# Patient Record
Sex: MALE | Race: WHITE | NOT HISPANIC OR LATINO | URBAN - METROPOLITAN AREA
[De-identification: names, ages, dates, MRNs, and addresses within clinical notes are randomized per-mention and may not be internally consistent; named-entity substitution may affect disease eponyms.]

---

## 2019-02-26 ENCOUNTER — IMPORTED ENCOUNTER (OUTPATIENT)
Dept: URBAN - METROPOLITAN AREA CLINIC 50 | Facility: CLINIC | Age: 79
End: 2019-02-26

## 2019-10-01 ENCOUNTER — IMPORTED ENCOUNTER (OUTPATIENT)
Dept: URBAN - METROPOLITAN AREA CLINIC 50 | Facility: CLINIC | Age: 79
End: 2019-10-01

## 2020-06-17 NOTE — PATIENT DISCUSSION
Corneal Scar Counseling: I have discussed the diagnosis of corneal scar with the patient. I have recommended the use of artificial tears to minimize dryness.  Consider future consult with Corneal Specialist.

## 2020-06-17 NOTE — PATIENT DISCUSSION
CORNEAL ULCER, OS:  CONTACT [facility]. PRESCRIBE OCUFLOX Q15 MIN FOR 1 HR THEN Q1H THEREAFTER. RX EMYCIN SOINA QHS. RETURN FOR FOLLOW-UP AS SCHEDULED.

## 2020-10-06 ENCOUNTER — IMPORTED ENCOUNTER (OUTPATIENT)
Dept: URBAN - METROPOLITAN AREA CLINIC 50 | Facility: CLINIC | Age: 80
End: 2020-10-06

## 2021-04-17 ASSESSMENT — VISUAL ACUITY
OS_CC: J1+
OS_CC: 20/30-1
OD_CC: 20/30-2
OS_PH: 20/25-2
OD_CC: 20/40+1
OS_CC: 20/30
OS_SC: 20/40-1
OD_OTHER: 20/20-. 20/30-.
OS_BAT: 20/30
OS_CC: J1@ 14 IN
OS_CC: J1@ 14 IN
OS_OTHER: 20/70. 20/400.
OD_CC: J1+
OD_BAT: 20/70
OD_BAT: 20/50
OD_SC: 20/25
OD_BAT: 20/20-
OD_CC: J1@ 14 IN
OS_CC: 20/30-1
OS_BAT: 20/70
OD_CC: J1@ 14 IN
OD_OTHER: 20/50. 20/200.
OS_OTHER: 20/30. 20/50.
OS_BAT: 20/60-1

## 2021-04-17 ASSESSMENT — TONOMETRY
OD_IOP_MMHG: 12
OD_IOP_MMHG: 12
OS_IOP_MMHG: 13
OS_IOP_MMHG: 12
OS_IOP_MMHG: 11
OD_IOP_MMHG: 12

## 2021-10-22 ENCOUNTER — PREPPED CHART (OUTPATIENT)
Dept: URBAN - METROPOLITAN AREA CLINIC 52 | Facility: CLINIC | Age: 81
End: 2021-10-22

## 2021-10-28 ENCOUNTER — CATARACT EVALUATION (OUTPATIENT)
Dept: URBAN - METROPOLITAN AREA CLINIC 52 | Facility: CLINIC | Age: 81
End: 2021-10-28

## 2021-10-28 DIAGNOSIS — H25.811: ICD-10-CM

## 2021-10-28 DIAGNOSIS — H25.812: ICD-10-CM

## 2021-10-28 DIAGNOSIS — H43.813: ICD-10-CM

## 2021-10-28 DIAGNOSIS — H33.011: ICD-10-CM

## 2021-10-28 PROCEDURE — 92134 CPTRZ OPH DX IMG PST SGM RTA: CPT

## 2021-10-28 PROCEDURE — 92015 DETERMINE REFRACTIVE STATE: CPT

## 2021-10-28 PROCEDURE — 92014 COMPRE OPH EXAM EST PT 1/>: CPT

## 2021-10-28 ASSESSMENT — VISUAL ACUITY
OU_CC: 20/30
OD_SC: 20/70+1
OD_CC: 20/70
OS_CC: 20/30
OU_CC: J1+ @ 14IN
OS_GLARE: 20/70
OS_SC: 20/40-1
OD_PH: 20/40
OS_CC: J1+ @ 14IN
OS_PH: 20/30
OD_GLARE: 20/400
OS_GLARE: 20/40
OD_CC: J1+ @ 14IN
OD_GLARE: 20/70

## 2021-10-28 ASSESSMENT — TONOMETRY
OD_IOP_MMHG: 10
OS_IOP_MMHG: 10

## 2021-11-10 ENCOUNTER — BIOMETRY (OUTPATIENT)
Dept: URBAN - METROPOLITAN AREA CLINIC 52 | Facility: CLINIC | Age: 81
End: 2021-11-10

## 2021-11-10 DIAGNOSIS — H25.811: ICD-10-CM

## 2021-11-10 PROCEDURE — TOPOIOL CORNEAL TOPOGRAPHY-PREMIUM IOL

## 2021-11-10 PROCEDURE — 92136 OPHTHALMIC BIOMETRY: CPT

## 2021-11-10 ASSESSMENT — KERATOMETRY
OS_K2POWER_DIOPTERS: 43.62
OD_K2POWER_DIOPTERS: 43.00
OD_AXISANGLE2_DEGREES: 90
OS_K1POWER_DIOPTERS: 43.62
OS_AXISANGLE2_DEGREES: 90
OS_AXISANGLE_DEGREES: 0
OD_K1POWER_DIOPTERS: 43.00
OD_AXISANGLE_DEGREES: 0

## 2021-12-09 ENCOUNTER — PRE-OP/H&P (OUTPATIENT)
Dept: URBAN - METROPOLITAN AREA CLINIC 52 | Facility: CLINIC | Age: 81
End: 2021-12-09

## 2021-12-09 DIAGNOSIS — H25.811: ICD-10-CM

## 2021-12-09 DIAGNOSIS — H43.813: ICD-10-CM

## 2021-12-09 PROCEDURE — 92134 CPTRZ OPH DX IMG PST SGM RTA: CPT

## 2021-12-09 PROCEDURE — PREOP PRE OP VISIT

## 2021-12-09 ASSESSMENT — KERATOMETRY
OD_K2POWER_DIOPTERS: 43.00
OS_AXISANGLE2_DEGREES: 90
OS_K2POWER_DIOPTERS: 43.62
OD_AXISANGLE2_DEGREES: 90
OS_K1POWER_DIOPTERS: 43.62
OD_AXISANGLE_DEGREES: 0
OS_AXISANGLE_DEGREES: 0
OD_K1POWER_DIOPTERS: 43.00

## 2021-12-09 ASSESSMENT — VISUAL ACUITY
OS_SC: 20/40
OD_SC: 20/60

## 2021-12-09 ASSESSMENT — TONOMETRY
OS_IOP_MMHG: 11
OD_IOP_MMHG: 11

## 2021-12-15 ENCOUNTER — SURGERY/PROCEDURE (OUTPATIENT)
Dept: URBAN - METROPOLITAN AREA SURGERY 16 | Facility: SURGERY | Age: 81
End: 2021-12-15

## 2021-12-15 DIAGNOSIS — H25.811: ICD-10-CM

## 2021-12-15 PROBLEM — Z96.1: Noted: 2021-12-15

## 2021-12-15 PROCEDURE — 66984 XCAPSL CTRC RMVL W/O ECP: CPT

## 2021-12-15 ASSESSMENT — KERATOMETRY
OD_AXISANGLE_DEGREES: 0
OD_AXISANGLE2_DEGREES: 90
OD_K2POWER_DIOPTERS: 43.00
OS_AXISANGLE2_DEGREES: 90
OS_K2POWER_DIOPTERS: 43.62
OS_K1POWER_DIOPTERS: 43.62
OS_AXISANGLE_DEGREES: 0
OD_K1POWER_DIOPTERS: 43.00

## 2021-12-16 ENCOUNTER — POST-OP (OUTPATIENT)
Dept: URBAN - METROPOLITAN AREA CLINIC 52 | Facility: CLINIC | Age: 81
End: 2021-12-16

## 2021-12-16 DIAGNOSIS — Z98.41: ICD-10-CM

## 2021-12-16 DIAGNOSIS — Z96.1: ICD-10-CM

## 2021-12-16 PROCEDURE — 99024 POSTOP FOLLOW-UP VISIT: CPT

## 2021-12-16 ASSESSMENT — KERATOMETRY
OS_K2POWER_DIOPTERS: 43.62
OD_AXISANGLE2_DEGREES: 90
OD_AXISANGLE_DEGREES: 0
OD_K1POWER_DIOPTERS: 43.00
OS_AXISANGLE_DEGREES: 0
OD_K2POWER_DIOPTERS: 43.00
OS_K1POWER_DIOPTERS: 43.62
OS_AXISANGLE2_DEGREES: 90

## 2021-12-16 ASSESSMENT — VISUAL ACUITY: OD_SC: 20/50

## 2021-12-16 ASSESSMENT — TONOMETRY: OD_IOP_MMHG: 15

## 2021-12-22 ENCOUNTER — POST-OP (OUTPATIENT)
Dept: URBAN - METROPOLITAN AREA CLINIC 52 | Facility: CLINIC | Age: 81
End: 2021-12-22

## 2021-12-22 DIAGNOSIS — Z98.41: ICD-10-CM

## 2021-12-22 DIAGNOSIS — Z96.1: ICD-10-CM

## 2021-12-22 PROCEDURE — 99024 POSTOP FOLLOW-UP VISIT: CPT

## 2021-12-22 ASSESSMENT — TONOMETRY
OD_IOP_MMHG: 12
OS_IOP_MMHG: 12

## 2021-12-22 ASSESSMENT — VISUAL ACUITY
OD_PH: 20/30
OD_SC: 20/40
OS_CC: 20/30

## 2021-12-22 ASSESSMENT — KERATOMETRY
OD_K2POWER_DIOPTERS: 43.00
OS_K1POWER_DIOPTERS: 43.62
OD_AXISANGLE2_DEGREES: 90
OS_K2POWER_DIOPTERS: 43.62
OD_K1POWER_DIOPTERS: 43.00
OS_AXISANGLE2_DEGREES: 90
OS_AXISANGLE_DEGREES: 0
OD_AXISANGLE_DEGREES: 0

## 2022-01-20 ENCOUNTER — POST OP/EVAL OF SECOND EYE (OUTPATIENT)
Dept: URBAN - METROPOLITAN AREA CLINIC 52 | Facility: CLINIC | Age: 82
End: 2022-01-20

## 2022-01-20 DIAGNOSIS — H25.812: ICD-10-CM

## 2022-01-20 PROCEDURE — PREOP PRE OP VISIT

## 2022-01-20 PROCEDURE — 92136 OPHTHALMIC BIOMETRY: CPT

## 2022-01-20 ASSESSMENT — KERATOMETRY
OS_AXISANGLE2_DEGREES: 90
OS_AXISANGLE_DEGREES: 0
OD_AXISANGLE_DEGREES: 0
OS_K2POWER_DIOPTERS: 43.62
OD_K2POWER_DIOPTERS: 43.00
OS_K1POWER_DIOPTERS: 43.62
OD_K1POWER_DIOPTERS: 43.00
OD_AXISANGLE2_DEGREES: 90

## 2022-01-20 ASSESSMENT — VISUAL ACUITY
OS_CC: 20/40
OD_SC: 20/25-1

## 2022-01-20 ASSESSMENT — TONOMETRY
OS_IOP_MMHG: 12
OD_IOP_MMHG: 12

## 2022-02-02 ENCOUNTER — POST-OP (OUTPATIENT)
Dept: URBAN - METROPOLITAN AREA CLINIC 52 | Facility: CLINIC | Age: 82
End: 2022-02-02

## 2022-02-02 ENCOUNTER — SURGERY/PROCEDURE (OUTPATIENT)
Dept: URBAN - METROPOLITAN AREA SURGERY 16 | Facility: SURGERY | Age: 82
End: 2022-02-02

## 2022-02-02 DIAGNOSIS — Z96.1: ICD-10-CM

## 2022-02-02 DIAGNOSIS — H25.812: ICD-10-CM

## 2022-02-02 DIAGNOSIS — Z98.42: ICD-10-CM

## 2022-02-02 PROCEDURE — 66984 XCAPSL CTRC RMVL W/O ECP: CPT

## 2022-02-02 PROCEDURE — 99024 POSTOP FOLLOW-UP VISIT: CPT

## 2022-02-02 ASSESSMENT — KERATOMETRY
OD_K2POWER_DIOPTERS: 43.00
OS_AXISANGLE_DEGREES: 0
OS_AXISANGLE2_DEGREES: 90
OS_K1POWER_DIOPTERS: 43.62
OS_AXISANGLE_DEGREES: 0
OD_AXISANGLE2_DEGREES: 90
OD_AXISANGLE2_DEGREES: 90
OD_K2POWER_DIOPTERS: 43.00
OS_K1POWER_DIOPTERS: 43.62
OS_AXISANGLE2_DEGREES: 90
OD_K1POWER_DIOPTERS: 43.00
OD_K1POWER_DIOPTERS: 43.00
OD_AXISANGLE_DEGREES: 0
OD_AXISANGLE_DEGREES: 0
OS_K2POWER_DIOPTERS: 43.62
OS_K2POWER_DIOPTERS: 43.62

## 2022-02-02 ASSESSMENT — TONOMETRY: OS_IOP_MMHG: 20

## 2022-02-02 ASSESSMENT — VISUAL ACUITY
OS_SC: 20/40
OS_PH: 20/30

## 2022-02-09 ENCOUNTER — POST-OP (OUTPATIENT)
Dept: URBAN - METROPOLITAN AREA CLINIC 52 | Facility: CLINIC | Age: 82
End: 2022-02-09

## 2022-02-09 DIAGNOSIS — Z98.42: ICD-10-CM

## 2022-02-09 DIAGNOSIS — Z96.1: ICD-10-CM

## 2022-02-09 PROCEDURE — 99024 POSTOP FOLLOW-UP VISIT: CPT

## 2022-02-09 ASSESSMENT — TONOMETRY
OD_IOP_MMHG: 11
OS_IOP_MMHG: 12

## 2022-02-09 ASSESSMENT — VISUAL ACUITY
OD_SC: 20/20-2
OU_SC: 20/20
OS_SC: 20/25

## 2022-02-09 ASSESSMENT — KERATOMETRY
OD_K1POWER_DIOPTERS: 43.00
OD_AXISANGLE_DEGREES: 0
OS_AXISANGLE_DEGREES: 0
OS_AXISANGLE2_DEGREES: 90
OS_K2POWER_DIOPTERS: 43.62
OS_K1POWER_DIOPTERS: 43.62
OD_AXISANGLE2_DEGREES: 90
OD_K2POWER_DIOPTERS: 43.00

## 2022-03-02 ENCOUNTER — POST-OP (OUTPATIENT)
Dept: URBAN - METROPOLITAN AREA CLINIC 52 | Facility: CLINIC | Age: 82
End: 2022-03-02

## 2022-03-02 DIAGNOSIS — Z98.41: ICD-10-CM

## 2022-03-02 DIAGNOSIS — Z96.1: ICD-10-CM

## 2022-03-02 DIAGNOSIS — Z98.42: ICD-10-CM

## 2022-03-02 PROCEDURE — 99024 POSTOP FOLLOW-UP VISIT: CPT

## 2022-03-02 ASSESSMENT — KERATOMETRY
OD_K1POWER_DIOPTERS: 43.00
OS_AXISANGLE2_DEGREES: 90
OD_K2POWER_DIOPTERS: 43.00
OS_AXISANGLE_DEGREES: 0
OD_AXISANGLE_DEGREES: 0
OS_K2POWER_DIOPTERS: 43.62
OS_K1POWER_DIOPTERS: 43.62
OD_AXISANGLE2_DEGREES: 90

## 2022-03-02 ASSESSMENT — TONOMETRY
OS_IOP_MMHG: 11
OD_IOP_MMHG: 11

## 2022-03-02 ASSESSMENT — VISUAL ACUITY
OS_SC: 20/25+2
OU_SC: 20/20
OD_SC: 20/20-2

## 2022-07-06 ENCOUNTER — ESTABLISHED PATIENT (OUTPATIENT)
Dept: URBAN - METROPOLITAN AREA CLINIC 52 | Facility: CLINIC | Age: 82
End: 2022-07-06

## 2022-07-06 DIAGNOSIS — H43.813: ICD-10-CM

## 2022-07-06 DIAGNOSIS — Z96.1: ICD-10-CM

## 2022-07-06 PROCEDURE — 92014 COMPRE OPH EXAM EST PT 1/>: CPT

## 2022-07-06 ASSESSMENT — VISUAL ACUITY
OU_CC: 20/20-1
OD_GLARE: 20/20
OD_GLARE: 20/20
OU_CC: J1+@14IN
OS_GLARE: 20/25
OS_CC: 20/20-2
OD_CC: 20/20-1
OS_GLARE: 20/25

## 2022-07-06 ASSESSMENT — KERATOMETRY
OS_AXISANGLE2_DEGREES: 90
OD_AXISANGLE_DEGREES: 7
OD_K2POWER_DIOPTERS: 44.00
OD_AXISANGLE2_DEGREES: 97
OD_K1POWER_DIOPTERS: 43.00
OS_K2POWER_DIOPTERS: 43.75
OS_AXISANGLE_DEGREES: 0
OS_K1POWER_DIOPTERS: 43.75

## 2022-07-06 ASSESSMENT — TONOMETRY
OD_IOP_MMHG: 11
OS_IOP_MMHG: 10

## 2023-07-12 ENCOUNTER — COMPREHENSIVE EXAM (OUTPATIENT)
Dept: URBAN - METROPOLITAN AREA CLINIC 52 | Facility: CLINIC | Age: 83
End: 2023-07-12

## 2023-07-12 DIAGNOSIS — H18.513: ICD-10-CM

## 2023-07-12 DIAGNOSIS — H02.403: ICD-10-CM

## 2023-07-12 DIAGNOSIS — H04.123: ICD-10-CM

## 2023-07-12 DIAGNOSIS — H43.813: ICD-10-CM

## 2023-07-12 PROCEDURE — 92015 DETERMINE REFRACTIVE STATE: CPT

## 2023-07-12 PROCEDURE — 92014 COMPRE OPH EXAM EST PT 1/>: CPT

## 2023-07-12 RX ORDER — POVIDONE 2.5 MG/.5G: SOLUTION, GEL FORMING / DROPS OPHTHALMIC

## 2023-07-12 ASSESSMENT — VISUAL ACUITY
OD_SC: 20/20
OS_SC: 20/20
OU_CC: 20/20
OU_CC: J1+@16"
OU_SC: 20/20
OS_CC: 20/20
OD_CC: 20/20

## 2023-07-12 ASSESSMENT — KERATOMETRY
OS_AXISANGLE2_DEGREES: 46
OS_AXISANGLE_DEGREES: 0
OD_K2POWER_DIOPTERS: 44.00
OS_K2POWER_DIOPTERS: 43.75
OS_K1POWER_DIOPTERS: 43.75
OD_AXISANGLE_DEGREES: 7
OD_K1POWER_DIOPTERS: 43.00
OS_K1POWER_DIOPTERS: 43.50
OS_AXISANGLE_DEGREES: 136
OS_AXISANGLE2_DEGREES: 90
OD_AXISANGLE2_DEGREES: 97
OD_AXISANGLE2_DEGREES: 91
OD_AXISANGLE_DEGREES: 01

## 2023-07-12 ASSESSMENT — TONOMETRY
OD_IOP_MMHG: 12
OS_IOP_MMHG: 12

## 2024-10-07 ENCOUNTER — COMPREHENSIVE EXAM (OUTPATIENT)
Dept: URBAN - METROPOLITAN AREA CLINIC 52 | Facility: CLINIC | Age: 84
End: 2024-10-07

## 2024-10-07 DIAGNOSIS — H43.813: ICD-10-CM

## 2024-10-07 DIAGNOSIS — H02.403: ICD-10-CM

## 2024-10-07 DIAGNOSIS — H04.123: ICD-10-CM

## 2024-10-07 DIAGNOSIS — H18.513: ICD-10-CM

## 2024-10-07 DIAGNOSIS — H33.011: ICD-10-CM

## 2024-10-07 DIAGNOSIS — H52.4: ICD-10-CM

## 2024-10-07 PROCEDURE — 99214 OFFICE O/P EST MOD 30 MIN: CPT

## 2024-10-07 PROCEDURE — 92015 DETERMINE REFRACTIVE STATE: CPT

## 2024-10-07 RX ORDER — TOBRAMYCIN / DEXAMETHASONE 3; .5 MG/ML; MG/ML
1 SUSPENSION/ DROPS OPHTHALMIC
Start: 2024-10-07